# Patient Record
(demographics unavailable — no encounter records)

---

## 2025-06-27 NOTE — HISTORY OF PRESENT ILLNESS
[Fruit] : fruit [Vegetables] : vegetables [Meat] : meat [Dairy] : dairy [Eats healthy meals and snacks] : eats healthy meals and snacks [Normal] : Normal [Toilet Trained] : toilet trained [In own bed] : In own bed [Brushing teeth twice/d] : brushing teeth twice per day [Flossing teeth] : flossing teeth [Yes] : Patient goes to dentist yearly [Toothpaste] : Primary Fluoride Source: Toothpaste [Participates in after-school activities] : participates in after-school activities [Grade ___] : Grade [unfilled] [Adequate social interactions] : adequate social interactions [Adequate performance] : adequate performance [Supervised outdoor play] : supervised outdoor play [Supervised around water] : supervised around water [Parent knows child's friends] : parent knows child's friends [Up to date] : Up to date [FreeTextEntry1] :  7 y/o with father for routine care  Hx of eczema, currently well controlled with emollient    No interval changes since last check up; no hospitalizations, no surgeries, no major illnesses   Eating well, variety of foods but father prefers if she ate more veggies Sees dentist twice a year, brushes teeth twice a day Sleeping well, denies sleep disturbance No concerns with stooling or urination; denies straining or constipation currently but sometimes w/episodes School: Entering 3rd grade, no services Activities: Enjoys athletics including lacrosse

## 2025-06-27 NOTE — DISCUSSION/SUMMARY
[Normal Growth] : growth [Normal Development] : development [School] : school [Development and Mental Health] : development and mental health [Nutrition and Physical Activity] : nutrition and physical activity [Oral Health] : oral health [Safety] : safety [Full Activity without restrictions including Physical Education & Athletics] : Full Activity without restrictions including Physical Education & Athletics [I have examined the above-named student and completed the preparticipation physical evaluation. The athlete does not present apparent clinical contraindications to practice and participate in sport(s) as outlined above. A copy of the physical exam is on r] : I have examined the above-named student and completed the preparticipation physical evaluation. The athlete does not present apparent clinical contraindications to practice and participate in sport(s) as outlined above. A copy of the physical exam is on record in my office and can be made available to the school at the request of the parents. If conditions arise after the athlete has been cleared for participation, the physician may rescind the clearance until the problem is resolved and the potential consequences are completely explained to the athlete (and parents/guardians). [FreeTextEntry1] :  Hearing failed on RT side, father not concerned. Pt with some fidgeting making testing difficult. Repeat next year, sooner if concerns arise Continue balanced diet with all food groups Brush teeth twice a day with toothbrush. Recommend regular visits to dentist.  Child needs to ride in a belt-positioning booster seat until  4 feet 9 inches has been reached and are between 8 and 12 years of age.  Help child to maintain consistent daily routines and sleep schedule.  School discussed Routine labs Ensure home is safe Teach child about personal safety. Use consistent, positive discipline. Limit screen time to no more than 2 hours per day Return in one year for annual physical, sooner PRN. Recommend returning for flu vaccine in Fall

## 2025-06-27 NOTE — PHYSICAL EXAM
[Alert] : alert [No Acute Distress] : no acute distress [Normocephalic] : normocephalic [Conjunctivae with no discharge] : conjunctivae with no discharge [PERRL] : PERRL [EOMI Bilateral] : EOMI bilateral [Auricles Well Formed] : auricles well formed [Clear Tympanic membranes with present light reflex and bony landmarks] : clear tympanic membranes with present light reflex and bony landmarks [No Discharge] : no discharge [Nares Patent] : nares patent [Pink Nasal Mucosa] : pink nasal mucosa [Palate Intact] : palate intact [Nonerythematous Oropharynx] : nonerythematous oropharynx [Supple, full passive range of motion] : supple, full passive range of motion [No Palpable Masses] : no palpable masses [Symmetric Chest Rise] : symmetric chest rise [Clear to Auscultation Bilaterally] : clear to auscultation bilaterally [Regular Rate and Rhythm] : regular rate and rhythm [Normal S1, S2 present] : normal S1, S2 present [No Murmurs] : no murmurs [+2 Femoral Pulses] : +2 femoral pulses [Soft] : soft [NonTender] : non tender [Non Distended] : non distended [Normoactive Bowel Sounds] : normoactive bowel sounds [No Hepatomegaly] : no hepatomegaly [No Splenomegaly] : no splenomegaly [Homer: _____] : Homer [unfilled] [Patent] : patent [No fissures] : no fissures [No Abnormal Lymph Nodes Palpated] : no abnormal lymph nodes palpated [No Gait Asymmetry] : no gait asymmetry [No pain or deformities with palpation of bone, muscles, joints] : no pain or deformities with palpation of bone, muscles, joints [Normal Muscle Tone] : normal muscle tone [Straight] : straight [Cranial Nerves Grossly Intact] : cranial nerves grossly intact [No Rash or Lesions] : no rash or lesions